# Patient Record
Sex: FEMALE | Race: WHITE | NOT HISPANIC OR LATINO | Employment: UNEMPLOYED | URBAN - NONMETROPOLITAN AREA
[De-identification: names, ages, dates, MRNs, and addresses within clinical notes are randomized per-mention and may not be internally consistent; named-entity substitution may affect disease eponyms.]

---

## 2019-06-19 ENCOUNTER — TRANSCRIBE ORDERS (OUTPATIENT)
Dept: ADMINISTRATIVE | Facility: HOSPITAL | Age: 59
End: 2019-06-19

## 2019-06-19 DIAGNOSIS — H92.03 OTALGIA OF BOTH EARS: ICD-10-CM

## 2019-06-19 DIAGNOSIS — H93.12 TINNITUS, LEFT EAR: Primary | ICD-10-CM

## 2019-07-11 ENCOUNTER — APPOINTMENT (OUTPATIENT)
Dept: MRI IMAGING | Facility: HOSPITAL | Age: 59
End: 2019-07-11

## 2022-05-19 ENCOUNTER — OFFICE VISIT (OUTPATIENT)
Dept: PSYCHIATRY | Facility: CLINIC | Age: 62
End: 2022-05-19

## 2022-05-19 DIAGNOSIS — F33.1 MAJOR DEPRESSIVE DISORDER, RECURRENT EPISODE, MODERATE: Primary | ICD-10-CM

## 2022-05-19 DIAGNOSIS — F41.1 GENERALIZED ANXIETY DISORDER: ICD-10-CM

## 2022-05-19 PROCEDURE — 90791 PSYCH DIAGNOSTIC EVALUATION: CPT | Performed by: SOCIAL WORKER

## 2022-05-19 NOTE — PROGRESS NOTES
IDENTIFYING INFORMATION:   The patient, Jennifer Bennett, is a 61 y.o. female, retired , , no children, who is here today for initial appointment starting at 11:30 am. and ending at 12:45 pm.. Patient is being seen at [address], [place] [address].    CHIEF COMPLIANT: Patient reports feeling lost, anxiety, not happy, and depressed for the past 2 years.    HPI: Patient comes in reporting that she had no problems with anxiety or depression until about 2 years ago.  Patient reports she is a retired  having worked for 27 years in [employer].  Patient reports that in 2009 she retired due to having some illnesses and needing a hysterectomy.  Patient reports she was 49 years old when she retired.  Patient states that the pandemic has caused her to isolate and not be able to get out and socialize like she used to.  Patient reports that she lost her father 1 year ago June 23.  Patient reports her mother who is 83 is now living with her sister in [place].  Patient reports that until last year she was caring for both of her parents in an apartment next to hers.  Patient reports that she has a lot of stress on her for the past 2 years.  Patient reports she bought an apartment complex which is overwhelming for her.  Patient reports that she now has constant thoughts of worry over money, her physical health, and family relationships.  Patient reports having a fear of becoming physically sick and living by herself.  Patient states that she just does not feel well and is dealing with some hearing loss.  Patient reports having no motivation, being tired all the time, feeling like she cannot make any decisions, low self-confidence, poor concentration, and crying spells, with thoughts of who would care if I live or die.  Patient reports also having symptoms of constant worry all of the time.  Patient reports at times feeling very bored, lonely, and sad.   Patient reports scaling her anxiety level at a 6 where 10 is worse.  Patient reports scaling her depression level at an 8 where 10 is worse.  Patient denies having any thoughts to harm herself or others at present time.    PAST PSYCH HISTORY: none          SUBSTANCE ABUSE HISTORY: none    FAMILY HISTORY: Patient reports growing up in several states and moving around a lot living in [place], [place], [place], and then as an adult living in [place] since 1994.  Patient reports graduating from high school and going to [employer] 4 years getting a degree in education.  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  Patient reports in 1990 she got  at the age of 30 for 4 years and  due to him being an alcoholic.  Patient reports that her parents were  and that her father suffered from anxiety his entire life.  Patient reports that no other family members other than her sister and nephew also suffer from anxiety and depression.  Patient reports no one in the family ever has committed suicide.    MEDICAL HISTORY: Patient is 5 feet 6 inches tall weighing 250 pounds.  Patient had a tonsillectomy, gallbladder removal, hysterectomy, ovarian cyst removal.  Patient is presently on Levothyroxin 25 mg daily and Nutrof will 4 capsules for hair loss nonprescription.    CURRENT MEDICATIONS:  No current outpatient medications on file.     No current facility-administered medications for this visit.           MENTAL STATUS EXAM:   Hygiene:   good  Cooperation:  Cooperative  Eye Contact:  Good  Psychomotor Behavior:  Appropriate  Affect:  Full range  Hopelessness: 3  Speech:  Normal  Thought Process:  Goal directed  Thought Content:  Normal  Suicidal:  None  Homicidal:  None  Hallucinations:  None  Delusion:  None  Memory:  Intact  Orientation:  Person, Place, Time and Situation  Reliability:  good  Insight:  Fair  Judgement:  Fair  Impulse Control:  Good  Physical/Medical Issues:  Yes  hypertension, allergies, and hearing loss,  Thyroid problems and rash on face.    PROBLEM LIST:   Anxiety, depression    STRENGTHS:    patient appears motivated for treatment is currently engaged and compliant    WEAKNESSES:  Low self confidence, poor coping skills dealing with losses,      SHORT-TERM GOALS: Patient will be compliant with clinic appointments.  Patient will be engaged in therapy, medication compliant with minimal side effects. Patient  will report decrease of symptoms and frequency.    LONG-TERM GOALS: Patient will have minimal symptoms of  with continued medication management. Patient will be compliant with treatment and appointments.     DIAGNOSIS:   Encounter Diagnoses   Name Primary?   • Major depressive disorder, recurrent episode, moderate (HCC) Yes   • Generalized anxiety disorder      Major depressive disorder, recurrent, moderate and generalized anxiety disorder    PLAN:   Patient will continue in monthly individual outpatient treatment and pharmacotherapy as scheduled.  Patient was scheduled with the nurse practitioner for further medication assessment.  Patient was explained cognitive therapy and encouraged her to focus on living 1 day at a time focusing on the things she can change instead of what she cannot which is only her self to decrease her depression and anxiety.       The patient was instructed to call clinic as needed or go to ER if in crisis.       MARLEY REYES LCSW, [facility]